# Patient Record
Sex: FEMALE | Race: WHITE | NOT HISPANIC OR LATINO | Employment: OTHER | URBAN - METROPOLITAN AREA
[De-identification: names, ages, dates, MRNs, and addresses within clinical notes are randomized per-mention and may not be internally consistent; named-entity substitution may affect disease eponyms.]

---

## 2021-02-23 LAB — BCS RECOMMENDATION EXT: NORMAL

## 2022-05-05 DIAGNOSIS — D47.2 MGUS (MONOCLONAL GAMMOPATHY OF UNKNOWN SIGNIFICANCE): Primary | ICD-10-CM

## 2022-05-17 ENCOUNTER — OFFICE VISIT (OUTPATIENT)
Dept: HEMATOLOGY/ONCOLOGY | Facility: CLINIC | Age: 72
End: 2022-05-17
Payer: MEDICARE

## 2022-05-17 VITALS
OXYGEN SATURATION: 98 % | WEIGHT: 155.88 LBS | HEART RATE: 65 BPM | BODY MASS INDEX: 25.97 KG/M2 | TEMPERATURE: 98 F | DIASTOLIC BLOOD PRESSURE: 78 MMHG | HEIGHT: 65 IN | SYSTOLIC BLOOD PRESSURE: 143 MMHG | RESPIRATION RATE: 18 BRPM

## 2022-05-17 DIAGNOSIS — D89.2 HYPERGAMMAGLOBULINEMIA: Primary | ICD-10-CM

## 2022-05-17 PROCEDURE — 99213 PR OFFICE/OUTPT VISIT, EST, LEVL III, 20-29 MIN: ICD-10-PCS | Mod: ,,, | Performed by: INTERNAL MEDICINE

## 2022-05-17 PROCEDURE — 99213 OFFICE O/P EST LOW 20 MIN: CPT | Mod: ,,, | Performed by: INTERNAL MEDICINE

## 2022-05-17 RX ORDER — PRAVASTATIN SODIUM 40 MG/1
1 TABLET ORAL NIGHTLY
COMMUNITY
Start: 2022-05-16

## 2022-05-17 RX ORDER — PANTOPRAZOLE SODIUM 40 MG/1
40 TABLET, DELAYED RELEASE ORAL DAILY
COMMUNITY

## 2022-05-17 RX ORDER — MELOXICAM 15 MG/1
15 TABLET ORAL DAILY PRN
COMMUNITY
Start: 2022-02-17

## 2022-05-17 NOTE — PROGRESS NOTES
"[facility]    PATIENT: Alicia Miller  MRN: [id number]  DATE: 5/17/2022      Diagnosis:   1. Hypergammaglobulinemia        Chief Complaint: No complaint        Heme/Onc History:     History of Present Illness  Ms. Miller is a 71 yo female from [place] with inflammatory polyarthropathy diagnosed by Dr. Oleary who [facility] that revealed an M-spike of 0.8. IgG Lambda. She was been placed on a 10 day steroids on 4/1/21 and Cymbalta but did not start the steroids until she had some more myeloma labs at [facility], namely:  hepatitis panel negative, WBC 7.68, HGB 13.1, PLT 304K, MCV 95, ESR 40, UIEP (random) suspicious band with lambda specificity. Urine kappa FLC 30.3/ Lambda FLC 11.6. She had previously been diagnosed with fibromyalgia.    LABS:  11/10/21 M-spike 0.9, IgG 1346, U Lambda 44, Kappa 3.6, ratio 0.52 no serum flc ordered  8/16/21 IgG Lambda M-spike 0.8, IgG 1319, serum Lambda FLC 38, Kappa FLC 19, ratio 0.51  4/14/21 IgG ashlyn M-spike 0.8, IgG 1241, serum Lambda FLC 64, Kappa FLC 17, ratio 0.27    PMHx: GERD, hyperlipidemia  PSurgHx: partial thyroidectomy for what was thought to be a nodule but was "an extra thyroid"  Laser surgery for a "hole in her retina"  Cholecystectomy  Bilateral cataract surgery.    SocHx:  x 55 years. No children. smoked in her 20s but non since. Social drinker.  UTD on MMG and colonoscopy. Due for last colonoscopy 2022 done by Dr. Bryan Reaves    Interval History:    8/16/21: Ms. Vargas is here today for f/u for IgG Lambda MGUS. She feels good today and remains active at home. She reports itching after taking a bath occasionally and uses Aveeno soap/ Eucerin lotion to relieve. She is eliminating foods from her diet to decrease inflammation and improve symptoms related to fibromyalgia. She reports occasional constipation which is relieved with the use of stool softeners. She denies n/v/d, fevers, chills, weakness, " fatigue, bone pain, decreased appetite, confusion, HA, excessive thirst, skin rash, or bleeding. She has no other question or concerns today.  11/17/21 M-spike 0.9 (was 0.8 in 8/2021)UPEP no M-spike, IgG 1346 (was 1320), Kappa 3.6, lambda 44 (was 38) Ratio 0.52 (was 0.51 last time)  She has been referred to Harjinder Winter neurologist for chronic back pain due to bulging disc and stenosis. She has bone pain management and shots and have not helped. She is drinking green tea and eating a lot of nuts.    2/17/2022 patient presents to clinic today for 3 months follow-up visit for MGUS.  Has a history of chronic back pain which she is seeing neurosurgeon (Harjinder Winter), back pain has been better, however it causes her leg pain/weakness and she is considering surgery for correction of a bulging disc and spinal stenosis. Appointment is pending.  She was seen by orthopedist for recent shoulder pain. Was diagnosed with enthesitis and is taking Celebrex and prednisone. Doing better with decrease pain, increase ROM.  Otherwise states she has been feeling well.  She denies any fever, recurrent infections, chills. Does have night sweats, but are non-drenching and occur once every couple months. No bone pain.     Subjective:    Interval History: Ms. Miller returns for follow up of an intermittently appearing M spike.  She has an inflammatory polyarthropathy. She continues to have pruritis of undetermined etiology.  No new complaints.     Past Medical History:   Past Medical History:   Diagnosis Date    Fibromyalgia     Headache     MGUS (monoclonal gammopathy of unknown significance)     Osteopenia     Polyarthropathy     Rheumatoid factor positive     Vitamin D deficiency        Past Surgical HIstory:   Past Surgical History:   Procedure Laterality Date    CATARACT EXTRACTION, BILATERAL      CHOLECYSTECTOMY      THYROIDECTOMY         Family History:   Family History   Problem Relation Age of Onset    Uterine cancer  "Mother     Lung cancer Father        Social History:  reports that she has quit smoking. She has never used smokeless tobacco. She reports previous alcohol use. She reports that she does not use drugs.    Allergies:  Review of patient's allergies indicates:  No Known Allergies    Medications:  Current Outpatient Medications   Medication Sig Dispense Refill    meloxicam (MOBIC) 15 MG tablet Take 15 mg by mouth daily as needed.      pantoprazole (PROTONIX) 40 MG tablet Take 40 mg by mouth once daily.      pravastatin (PRAVACHOL) 40 MG tablet Take 1 tablet by mouth every evening.       No current facility-administered medications for this visit.       Review of Systems    Objective:      Vitals:   Vitals:    05/17/22 1053   BP: (!) 143/78   BP Location: Right arm   Patient Position: Sitting   Pulse: 65   Resp: 18   Temp: 98.4 °F (36.9 °C)   TempSrc: Oral   SpO2: 98%   Weight: 70.7 kg (155 lb 14.4 oz)   Height: 5' 4.96" (1.65 m)     BMI: Body mass index is 25.97 kg/m².    Physical Exam    Laboratory Data:  M spike not observed. CBC is normal, Creat 0.86    Imaging:   Assessment/Plan:       1. Hypergammaglobulinemia      Pt with intermittent M spike.  Most recently was not seen.  CBC is normal.  Will continue to monitor.  F/U 6 months with labs.           Ifeoma Apple MD    "

## 2022-11-03 DIAGNOSIS — D89.2 HYPERGAMMAGLOBULINEMIA: Primary | ICD-10-CM

## 2022-11-17 ENCOUNTER — OFFICE VISIT (OUTPATIENT)
Dept: HEMATOLOGY/ONCOLOGY | Facility: CLINIC | Age: 72
End: 2022-11-17
Payer: MEDICARE

## 2022-11-17 VITALS
OXYGEN SATURATION: 98 % | BODY MASS INDEX: 25.38 KG/M2 | WEIGHT: 152.31 LBS | SYSTOLIC BLOOD PRESSURE: 150 MMHG | DIASTOLIC BLOOD PRESSURE: 88 MMHG | HEART RATE: 82 BPM | TEMPERATURE: 98 F | RESPIRATION RATE: 18 BRPM | HEIGHT: 65 IN

## 2022-11-17 DIAGNOSIS — D47.2 MGUS (MONOCLONAL GAMMOPATHY OF UNKNOWN SIGNIFICANCE): Primary | ICD-10-CM

## 2022-11-17 DIAGNOSIS — D89.2 HYPERGAMMAGLOBULINEMIA: Primary | ICD-10-CM

## 2022-11-17 PROCEDURE — 99214 OFFICE O/P EST MOD 30 MIN: CPT | Mod: ,,, | Performed by: INTERNAL MEDICINE

## 2022-11-17 PROCEDURE — 99214 PR OFFICE/OUTPT VISIT, EST, LEVL IV, 30-39 MIN: ICD-10-PCS | Mod: ,,, | Performed by: INTERNAL MEDICINE

## 2022-11-17 NOTE — PROGRESS NOTES
"[facility]    PATIENT: Alicia Miller  MRN: [id number]  DATE: 11/17/2022      Diagnosis:   No diagnosis found.  MGUS    Chief Complaint: No chief complaint on file.        Heme/Onc History:     History of Present Illness  Ms. Miller is a 71 yo female from [place] with inflammatory polyarthropathy diagnosed by Dr. Oleary [facility] an M-spike of 0.8. IgG Lambda. She was been placed on a 10 day steroids on 4/1/21 and Cymbalta but did not start the steroids until she had some more myeloma labs at [facility], namely:  hepatitis panel negative, WBC 7.68, HGB 13.1, PLT 304K, MCV 95, ESR 40, UIEP (random) suspicious band with lambda specificity. Urine kappa FLC 30.3/ Lambda FLC 11.6. She had previously been diagnosed with fibromyalgia.    LABS:  11/16/22 SPEP M-spike 1.0, UPEP M-spike 0, LFLC 42/KFLC 20/ratio 0.47, CBC and CMP WNL  5/10/22 No M-spike on SPEP, LFLC 41/KFLC 18/ratio 0.45 Beta 2 microglobulin 1.6, CBC anc CMP WNL  11/10/21 M-spike 0.9, IgG 1346, U Lambda 44, Kappa 3.6, ratio 0.52 no serum flc ordered  8/16/21 IgG Lambda M-spike 0.8, IgG 1319, serum Lambda FLC 38, Kappa FLC 19, ratio 0.51  4/14/21 IgG ashlyn M-spike 0.8, IgG 1241, serum Lambda FLC 64, Kappa FLC 17, ratio 0.27    PMHx: GERD, hyperlipidemia  PSurgHx: partial thyroidectomy for what was thought to be a nodule but was "an extra thyroid"  Laser surgery for a "hole in her retina"  Cholecystectomy  Bilateral cataract surgery.    SocHx:  x 55 years. No children. smoked in her 20s but non since. Social drinker.  UTD on MMG and colonoscopy. Due for last colonoscopy 2022 done by Dr. Bryan Reaves      Subjective:    Interval History: Ms. Miller returns for follow up of an intermittently appearing M spike.  She has an inflammatory polyarthropathy.     8/16/21: Ms. Vargas is here today for f/u for IgG Lambda MGUS. She feels good today and remains active at home. She reports " itching after taking a bath occasionally and uses Aveeno soap/ Eucerin lotion to relieve. She is eliminating foods from her diet to decrease inflammation and improve symptoms related to fibromyalgia. She reports occasional constipation which is relieved with the use of stool softeners. She denies n/v/d, fevers, chills, weakness, fatigue, bone pain, decreased appetite, confusion, HA, excessive thirst, skin rash, or bleeding. She has no other question or concerns today.  11/17/21 M-spike 0.9 (was 0.8 in 8/2021)UPEP no M-spike, IgG 1346 (was 1320), Kappa 3.6, lambda 44 (was 38) Ratio 0.52 (was 0.51 last time)  She has been referred to Harjinder Winter neurologist for chronic back pain due to bulging disc and stenosis. She has bone pain management and shots and have not helped. She is drinking green tea and eating a lot of nuts.    2/17/2022 patient presents to clinic today for 3 months follow-up visit for MGUS.  Has a history of chronic back pain which she is seeing neurosurgeon (Harjinder Winter), back pain has been better, however it causes her leg pain/weakness and she is considering surgery for correction of a bulging disc and spinal stenosis. Appointment is pending.  She was seen by orthopedist for recent shoulder pain. Was diagnosed with enthesitis and is taking Celebrex and prednisone. Doing better with decrease pain, increase ROM.  Otherwise states she has been feeling well.  She denies any fever, recurrent infections, chills. Does have night sweats, but are non-drenching and occur once every couple months. No bone pain.   11/17/22 patient complains of intermittent bilateral knee pain. NSAIDs help. She did get an injection. No other new medical problems. Labs stable. M=spike has reappeared at 1.0, not much different form prior levels.     Past Medical History:   Past Medical History:   Diagnosis Date    Fibromyalgia     Headache     MGUS (monoclonal gammopathy of unknown significance)     Osteopenia     Polyarthropathy      Rheumatoid factor positive     Vitamin D deficiency        Past Surgical HIstory:   Past Surgical History:   Procedure Laterality Date    CATARACT EXTRACTION, BILATERAL      CHOLECYSTECTOMY      THYROIDECTOMY         Family History:   Family History   Problem Relation Age of Onset    Uterine cancer Mother     Lung cancer Father        Social History:  reports that she has quit smoking. She has never used smokeless tobacco. She reports that she does not currently use alcohol. She reports that she does not use drugs.    Allergies:  Review of patient's allergies indicates:  No Known Allergies    Medications:  Current Outpatient Medications   Medication Sig Dispense Refill    meloxicam (MOBIC) 15 MG tablet Take 15 mg by mouth daily as needed.      pantoprazole (PROTONIX) 40 MG tablet Take 40 mg by mouth once daily.      pravastatin (PRAVACHOL) 40 MG tablet Take 1 tablet by mouth every evening.       No current facility-administered medications for this visit.       Review of Systems   Constitutional: Negative.    HENT: Negative.     Eyes: Negative.    Respiratory: Negative.     Cardiovascular: Negative.    Gastrointestinal: Negative.    Endocrine: Negative.    Genitourinary: Negative.    Musculoskeletal:  Positive for arthralgias.   Neurological: Negative.      Objective:      Vitals:   There were no vitals filed for this visit.    BMI: There is no height or weight on file to calculate BMI.    Physical Exam  Constitutional:       Appearance: Normal appearance. She is normal weight.   HENT:      Head: Normocephalic.   Cardiovascular:      Rate and Rhythm: Normal rate and regular rhythm.   Pulmonary:      Effort: Pulmonary effort is normal.      Breath sounds: Normal breath sounds.   Abdominal:      General: Abdomen is flat.      Palpations: Abdomen is soft.   Musculoskeletal:         General: Normal range of motion.   Skin:     General: Skin is warm and dry.   Neurological:      General: No focal deficit present.       Mental Status: She is alert and oriented to person, place, and time.   Psychiatric:         Mood and Affect: Mood normal.     Laboratory Data:  11/22 M spike 1.0. L:K ratio stable. CBC and CMP are normal, Creat 0.86, UPEP no M-spike    Imaging:   Assessment/Plan:       No diagnosis found.    Pt with intermittent M spike.  Not present last visit. Is 1.0 this visit; previously 0.9, Light chains stable. CBC and CMP are normal.   Will continue to monitor.  F/U 6 months with labs:  CBC. CMP. IgG, SPEP, serum free light chains. Maged not need UPEP as it has been negative x 2.          Tiffani Sandra MD

## 2022-12-12 ENCOUNTER — DOCUMENTATION ONLY (OUTPATIENT)
Dept: FAMILY MEDICINE | Facility: CLINIC | Age: 72
End: 2022-12-12
Payer: MEDICARE